# Patient Record
Sex: FEMALE | Race: WHITE | URBAN - METROPOLITAN AREA
[De-identification: names, ages, dates, MRNs, and addresses within clinical notes are randomized per-mention and may not be internally consistent; named-entity substitution may affect disease eponyms.]

---

## 2023-04-17 ENCOUNTER — OFFICE VISIT (OUTPATIENT)
Dept: ONCOLOGY | Age: 88
End: 2023-04-17
Payer: MEDICARE

## 2023-04-17 VITALS
DIASTOLIC BLOOD PRESSURE: 98 MMHG | OXYGEN SATURATION: 98 % | HEIGHT: 62 IN | SYSTOLIC BLOOD PRESSURE: 172 MMHG | RESPIRATION RATE: 16 BRPM | WEIGHT: 116 LBS | TEMPERATURE: 98.3 F | BODY MASS INDEX: 21.35 KG/M2 | HEART RATE: 78 BPM

## 2023-04-17 DIAGNOSIS — Z85.118 HISTORY OF PRIMARY SMALL CELL CARCINOMA OF LUNG: Primary | ICD-10-CM

## 2023-04-17 DIAGNOSIS — C80.1 SMALL CELL CARCINOMA (HCC): ICD-10-CM

## 2023-04-17 DIAGNOSIS — C44.622 SQUAMOUS CELL CARCINOMA OF ARM, RIGHT: ICD-10-CM

## 2023-04-17 PROCEDURE — G8420 CALC BMI NORM PARAMETERS: HCPCS | Performed by: INTERNAL MEDICINE

## 2023-04-17 PROCEDURE — G8428 CUR MEDS NOT DOCUMENT: HCPCS | Performed by: INTERNAL MEDICINE

## 2023-04-17 PROCEDURE — 1036F TOBACCO NON-USER: CPT | Performed by: INTERNAL MEDICINE

## 2023-04-17 PROCEDURE — 1123F ACP DISCUSS/DSCN MKR DOCD: CPT | Performed by: INTERNAL MEDICINE

## 2023-04-17 PROCEDURE — 1090F PRES/ABSN URINE INCON ASSESS: CPT | Performed by: INTERNAL MEDICINE

## 2023-04-17 PROCEDURE — 99204 OFFICE O/P NEW MOD 45 MIN: CPT | Performed by: INTERNAL MEDICINE

## 2023-04-17 RX ORDER — FLUTICASONE PROPIONATE AND SALMETEROL 232; 14 UG/1; UG/1
POWDER, METERED RESPIRATORY (INHALATION)
COMMUNITY
Start: 2023-02-27

## 2023-04-17 RX ORDER — ALBUTEROL SULFATE 90 UG/1
AEROSOL, METERED RESPIRATORY (INHALATION)
COMMUNITY
Start: 2023-03-17

## 2023-04-17 RX ORDER — LORAZEPAM 0.5 MG/1
TABLET ORAL
COMMUNITY
Start: 2023-03-06

## 2023-04-17 RX ORDER — IMIPRAMINE HYDROCHLORIDE 25 MG/1
TABLET ORAL
COMMUNITY
Start: 2023-02-10

## 2023-04-17 RX ORDER — FAMOTIDINE 20 MG/1
TABLET, FILM COATED ORAL
COMMUNITY
Start: 2023-03-05

## 2023-04-17 NOTE — PATIENT INSTRUCTIONS
Patient Instructions from Today's Visit    Reason for Visit:  New Patient Squamous Cell Carcinoma/ History of Lung cancer     Diagnosis Information:  [email or web address] [email or web address]-us/[email or web address]-answers-[email or web address]-[email or web address]-[email or web address]-sheets    Plan:  History discussed    Follow Up: Follow up in December with labs prior    Recent Lab Results:  N/A    Treatment Summary has been discussed and given to patient: N/A    -------------------------------------------------------------------------------------------------------------------  Please call our office at [phone] if you have any  of the following symptoms:   Fever of 100.5 or greater  Chills  Shortness of breath  Swelling or pain in one leg    After office hours an answering service is available and will contact a provider for emergencies or if you are experiencing any of the above symptoms. Patient does express an interest in My Chart. My Chart log in information explained on the after visit summary printout at the [facility] desk.     Abdulkadir Rosado RN

## 2023-04-17 NOTE — PROGRESS NOTES
surveillance although at this point it is of questionable benefit. She understands and agrees to proceed. All questions were asked and answered to the best of my ability. In all, I spent 45 minutes in the care of Ms. Saint Clair today, over 50% of which was in direct counseling and coordination of care.           Marianne Atkinson MD  [facility]  [address] [place] [address]  Office : [phone]  Fax : [phone]

## 2023-11-29 ENCOUNTER — TELEPHONE (OUTPATIENT)
Dept: ONCOLOGY | Age: 88
End: 2023-11-29

## 2023-12-12 ENCOUNTER — HOSPITAL ENCOUNTER (OUTPATIENT)
Dept: CT IMAGING | Age: 88
Discharge: HOME | End: 2023-12-15
Attending: INTERNAL MEDICINE
Payer: MEDICARE

## 2023-12-12 DIAGNOSIS — C44.622 SQUAMOUS CELL CARCINOMA OF ARM, RIGHT: ICD-10-CM

## 2023-12-12 DIAGNOSIS — C80.1 SMALL CELL CARCINOMA (HCC): ICD-10-CM

## 2023-12-12 LAB — CREAT BLD-MCNC: 0.66 MG/DL (ref 0.8–1.5)

## 2023-12-12 PROCEDURE — 74177 CT ABD & PELVIS W/CONTRAST: CPT | Performed by: RADIOLOGY

## 2023-12-12 PROCEDURE — 71260 CT THORAX DX C+: CPT | Performed by: RADIOLOGY

## 2023-12-12 PROCEDURE — 6360000004 HC RX CONTRAST MEDICATION: Performed by: INTERNAL MEDICINE

## 2023-12-12 PROCEDURE — 74177 CT ABD & PELVIS W/CONTRAST: CPT

## 2023-12-12 PROCEDURE — 82565 ASSAY OF CREATININE: CPT

## 2023-12-12 RX ADMIN — IOPAMIDOL 100 ML: 755 INJECTION, SOLUTION INTRAVENOUS at 09:42

## 2023-12-12 RX ADMIN — DIATRIZOATE MEGLUMINE AND DIATRIZOATE SODIUM 15 ML: 660; 100 LIQUID ORAL; RECTAL at 09:42
